# Patient Record
Sex: FEMALE | URBAN - METROPOLITAN AREA
[De-identification: names, ages, dates, MRNs, and addresses within clinical notes are randomized per-mention and may not be internally consistent; named-entity substitution may affect disease eponyms.]

---

## 2023-05-15 ENCOUNTER — NURSE ONLY (OUTPATIENT)
Dept: INTERNAL MEDICINE CLINIC | Facility: HOSPITAL | Age: 33
End: 2023-05-15
Attending: EMERGENCY MEDICINE

## 2023-05-15 DIAGNOSIS — Z00.00 WELLNESS EXAMINATION: Primary | ICD-10-CM

## 2023-05-15 PROCEDURE — 86480 TB TEST CELL IMMUN MEASURE: CPT

## 2023-05-16 LAB
M TB IFN-G CD4+ T-CELLS BLD-ACNC: 0.91 IU/ML
M TB TUBERC IFN-G BLD QL: NEGATIVE
M TB TUBERC IGNF/MITOGEN IGNF CONTROL: >10 IU/ML
QFT TB1 AG MINUS NIL: 0.17 IU/ML
QFT TB2 AG MINUS NIL: 0.08 IU/ML

## 2023-10-19 ENCOUNTER — TELEPHONE (OUTPATIENT)
Dept: FAMILY MEDICINE CLINIC | Facility: CLINIC | Age: 33
End: 2023-10-19

## 2023-10-19 NOTE — TELEPHONE ENCOUNTER
Pt returned called, states she is not currently having an allergic reaction. States she had a reaction last year to the flu vaccine and wanted to talk to provider about a potential waiver. Pt reports that last year after she got the flu vaccine, she started to have numbness and tingling in hands and feet for about a week. Two year ago, she felt numbness and tingling on the hand she got the vaccine on.  Pt is worried about guillain barre

## 2023-10-19 NOTE — TELEPHONE ENCOUNTER
Can someone call pt and find out what her symptoms are? She scheduled influenza vaccine reaction appt in several weeks and this maybe cannot wait to be seen.

## 2023-10-20 NOTE — TELEPHONE ENCOUNTER
G.B.  is not something that I diagnose or manage but I certainly can talk to her about it and refer her to neurology if she'd like

## 2023-10-20 NOTE — TELEPHONE ENCOUNTER
USHA to return call to the office. Provided pt office phone [phone] along with office hours. Per Dr. Amina Dong, pt may come in to establish care and she can take a look at the waiver that is required for pt's work. If it does not ask for specific diagnosis of G.B., she may write a note for work explaining that pt does not want to received the flu vaccine based on her previous symptoms. If pt needs to to have a diagnosis for the waiver, then she will need to be evaluated and diagnosed by neurology who would provider the waiver for flu vaccines.

## 2023-10-31 ENCOUNTER — OFFICE VISIT (OUTPATIENT)
Dept: FAMILY MEDICINE CLINIC | Facility: CLINIC | Age: 33
End: 2023-10-31

## 2023-10-31 VITALS
BODY MASS INDEX: 19.39 KG/M2 | SYSTOLIC BLOOD PRESSURE: 116 MMHG | TEMPERATURE: 99 F | HEART RATE: 72 BPM | OXYGEN SATURATION: 100 % | RESPIRATION RATE: 16 BRPM | WEIGHT: 96.19 LBS | HEIGHT: 59 IN | DIASTOLIC BLOOD PRESSURE: 78 MMHG

## 2023-10-31 DIAGNOSIS — H01.001 BLEPHARITIS OF RIGHT UPPER EYELID, UNSPECIFIED TYPE: Primary | ICD-10-CM

## 2023-10-31 PROCEDURE — 3008F BODY MASS INDEX DOCD: CPT | Performed by: NURSE PRACTITIONER

## 2023-10-31 PROCEDURE — 3078F DIAST BP <80 MM HG: CPT | Performed by: NURSE PRACTITIONER

## 2023-10-31 PROCEDURE — 99213 OFFICE O/P EST LOW 20 MIN: CPT | Performed by: NURSE PRACTITIONER

## 2023-10-31 PROCEDURE — 3074F SYST BP LT 130 MM HG: CPT | Performed by: NURSE PRACTITIONER

## 2023-10-31 NOTE — PATIENT INSTRUCTIONS
Continue warm compresses and eyelid scrubs  Follow up with ophthalmology if not improving over the next 3-4 days  Follow up sooner for new or worsening symptoms    [facility] eye [place] [id number] [address] Ophthalmology  Teresa Aguilar [id number] [facility] [place]  [phone]

## 2023-11-10 ENCOUNTER — OFFICE VISIT (OUTPATIENT)
Dept: FAMILY MEDICINE CLINIC | Facility: CLINIC | Age: 33
End: 2023-11-10
Payer: COMMERCIAL

## 2023-11-10 VITALS
WEIGHT: 96.38 LBS | OXYGEN SATURATION: 97 % | HEART RATE: 86 BPM | RESPIRATION RATE: 21 BRPM | BODY MASS INDEX: 19.17 KG/M2 | HEIGHT: 59.4 IN | SYSTOLIC BLOOD PRESSURE: 98 MMHG | TEMPERATURE: 99 F | DIASTOLIC BLOOD PRESSURE: 76 MMHG

## 2023-11-10 DIAGNOSIS — G62.9 NEUROPATHY: Primary | ICD-10-CM

## 2023-11-10 PROCEDURE — 3078F DIAST BP <80 MM HG: CPT | Performed by: FAMILY MEDICINE

## 2023-11-10 PROCEDURE — 99213 OFFICE O/P EST LOW 20 MIN: CPT | Performed by: FAMILY MEDICINE

## 2023-11-10 PROCEDURE — 3008F BODY MASS INDEX DOCD: CPT | Performed by: FAMILY MEDICINE

## 2023-11-10 PROCEDURE — 3074F SYST BP LT 130 MM HG: CPT | Performed by: FAMILY MEDICINE

## 2023-11-17 ENCOUNTER — TELEPHONE (OUTPATIENT)
Dept: INTERNAL MEDICINE CLINIC | Facility: HOSPITAL | Age: 33
End: 2023-11-17

## 2023-11-17 NOTE — TELEPHONE ENCOUNTER
Outside Covid Testing done    Results and RTW guidelines:    COVID RESULT reported:      Test type:    [] Rapid outside         [] PCR outside       [x] Home Test    Date of test: 11/17/2023     Test location: Home         [] Result viewed in Epic with verbal consent received from employee     [x] Results per Employee Covid Dashboard    [] Employee instructed to email copy of result to [email or web address]       [x] Discussed with employee     [] Unable to reach by phone. Sent via [employer]          [x] Positive    - Employee should quarantine at home for at least 5 days (day 1 is day after sx onset) , follow the CDC guidelines for cleaning and                              quarantining; see CDC.gov   -This employee may RTW on day 6 if asymptomatic or mildly symptomatic (with improving symptoms). Call Employee Health on day 5 if unable to return on                      day 6 after symptom onset.    -This employee needs to call Employee Health on day 5 after symptom onset. The employee needs to be cleared by Employee Health. - If Employee is still experiencing severe symptoms must make a RTW appt with Employee Health, Employee will not be cleared if:    1. Has consistent cough, shortness of breath or fatigue that restricts your physical activities    2. Is still feeling \"unwell\"    3. Within 15 days of hospitalization for COVID    4. Within 20 days of intubation for COVID    5. Still has a fever, vomiting or diarrhea   - Keep communication open with management about RTW and if symptoms worsen    - Monitor sx and temperature    - Employee should quarantine at home for at least 5 days from sx onset, follow the CDC guidelines for cleaning and quarantining; see CDC. gov                  - Notify PCP of result                 - Seek emergent care with worsening symptoms        Notes:     RTW PLAN:    [x]  If COVID positive results, off work minimum of 5 days from positive test or onset of symptoms (day 0) On day 5, if asymptomatic or mildly symptomatic (with improving symptoms) may return to work day 6          On day 5, if symptomatic, call Employee Health for RTW screening        []  COVID positive result - call Employee Health on day 5 after symptom onset. The employee needs to be cleared by Employee Health to RTW. [] RTW immediately, continue to monitor for sx  [] RTW when sx improve; must be fever free for 24 hours w/o medications, Diarrhea/Vomiting free for 24 hours w/o medications  [] Alinity ordered; continue to monitor sx and call for new/worsening sx. Discuss RTW guidelines with manager                                                                                                                                                                        [] Rapid ordered to confirm home negative. [] May continue to work  [x] Follow up with PCP  [] Home until further instruction from hotline with Alinity results  INSTRUCTIONS PROVIDED:  [x]  Plan as noted above  []  Length of time to obtain results  []  May return to work if views negative in My Chart and  remains fever, vomiting, and diarrhea free  []  May continue to work if remains asymptomatic   [x]  Quarantine instructions  [x]  Masking protocol  []  S/S of worsening infection/condition and importance of prompt medical re-evaluation including when to seek emergency care  [] If symptoms develop, stay home and call hotline for rapid test order    Estimated RTW date:  11/20/2023  [x] Manager notified        [x] [facility]  []AVIVA   [] [address] [place] [address]  Manager : Danyell Galeas    HAVE YOU RECEIVED THE COVID-19 Vaccine?  Yes [x]    No []          If yes, date(s) received:   12/21/2020; 01/07/2021; 12/24/2021         Which vaccine:  Aidan Yeung []     Dominick Fagan [x]    J&J []      SYMPTOMS (reported via dashboard):  [] asymptomatic  [x] symptomatic  [] GI symptoms only    Symptom onset date: 11/14/2023    Fever   > 100F             Yes []      Cough                          Yes [x] Shortness of breath  Yes []      Congestion                 Yes []      Runny nose                Yes [x]        Loss of Smell              Yes []        Loss of Taste             Yes []       Sore throat                 Yes [x]       Fatigue                        Yes []       Body Aches                Yes [x]        Chills                           Yes [x]        Headache                   Yes [x]             GI symptoms             Yes []     No [x]                     Nausea   []          Vomiting            []                                    Diarrhea  []          Upset stomach []      Employee has positive COVID Exposure? Yes [x]     No []    Date of exposure: 11/13/2023    [x]  Coworker                       [] patient                        [] Family/friend    Employee has a history of Covid?   Yes []     No [x]   If Yes, when:    When was the last shift you worked?: 11/15/2023    Notes:

## 2024-02-08 ENCOUNTER — OFFICE VISIT (OUTPATIENT)
Dept: NEUROLOGY | Facility: CLINIC | Age: 34
End: 2024-02-08
Payer: COMMERCIAL

## 2024-02-08 VITALS
HEART RATE: 74 BPM | BODY MASS INDEX: 19 KG/M2 | RESPIRATION RATE: 16 BRPM | DIASTOLIC BLOOD PRESSURE: 66 MMHG | SYSTOLIC BLOOD PRESSURE: 106 MMHG | WEIGHT: 97 LBS

## 2024-02-08 DIAGNOSIS — R20.2 TINGLING IN EXTREMITIES: ICD-10-CM

## 2024-02-08 DIAGNOSIS — R20.2 PARESTHESIA: Primary | ICD-10-CM

## 2024-02-08 PROCEDURE — 99204 OFFICE O/P NEW MOD 45 MIN: CPT | Performed by: OTHER

## 2024-02-08 NOTE — PROGRESS NOTES
Patient reports she had flu shot Fall 2022 and since then has had ? Liriano Manzanita symptoms- A few days after shot had numbness tingling of finger tips and distal portions of both feet resoved after 1 week. Would ralph Dr Cox's opinion if she should get Flu shot in future.

## 2024-02-08 NOTE — PATIENT INSTRUCTIONS
After your visit at the [place] office  today, please direct any follow up questions or medication needs to the staff in our [place] office so that your concerns may be promptly addressed.  We are available through [employer] or at the numbers below:    The phone number is:   [phone], option 1    The fax number is:  [phone]    Your pharmacy should also send any requests electronically to the [place] office.       Refill policies:    Allow 2-3 business days for refills; controlled substances may take longer.  Contact your pharmacy at least 5 days prior to running out of medication and have them send an electronic request or submit request through the “request refill” option in your [employer] account.  Refills are not addressed on weekends; covering physicians do not authorize routine medications on weekends.  No narcotics or controlled substances are refilled after noon on Fridays or by on call physicians.  By law, narcotics must be electronically prescribed.  A 30 day supply with no refills is the maximum allowed.  If your prescription is due for a refill, you may be due for a follow up appointment.  To best provide you care, patients receiving routine medications need to be seen at least once a year.  Patients receiving narcotic/controlled substance medications need to be seen at least once every 3 months.  In the event that your preferred pharmacy does not have the requested medication in stock (e.g. Backordered), it is your responsibility to find another pharmacy that has the requested medication available.  We will gladly send a new prescription to that pharmacy at your request.    Scheduling Tests:    If your physician has ordered radiology tests such as MRI or CT scans, please contact Central Scheduling at [phone] right away to schedule the test.  Once scheduled, the [facility] Centralized Referral Team will work with your insurance carrier to obtain pre-certification or prior authorization.   Depending on your insurance carrier, approval may take 3-10 days.  It is highly recommended patients assure they have received an authorization before having a test performed.  If test is done without insurance authorization, patient may be responsible for the entire amount billed.      Precertification and Prior Authorizations:  If your physician has recommended that you have a procedure or additional testing performed the [facility] Centralized Referral Team will contact your insurance carrier to obtain pre-certification or prior authorization.    You are strongly encouraged to contact your insurance carrier to verify that your procedure/test has been approved and is a COVERED benefit.  Although the [facility] Centralized Referral Team does its due diligence, the insurance carrier gives the disclaimer that \"Although the procedure is authorized, this does not guarantee payment.\"    Ultimately the patient is responsible for payment.   Thank you for your understanding in this matter.  Paperwork Completion:  If you require FMLA or disability paperwork for your recovery, please make sure to either drop it off or have it faxed to our office at [phone]. Be sure the form has your name and date of birth on it.  The form will be faxed to our Forms Department and they will complete it for you.  There is a 25$ fee for all forms that need to be filled out.  Please be aware there is a 10-14 day turnaround time.  You will need to sign a release of information (MOIRA) form if your paperwork does not come with one.  You may call the Forms Department with any questions at [phone].  Their fax number is [phone].

## 2024-02-08 NOTE — PROGRESS NOTES
HPI:    Patient ID: Deborah Merino is a 33 year old female.  PCP: Dr Maldonado     HPI  Patient is a 33-year-old female who presented for evaluation of episode of paresthesias.  She reports following a flu vaccine in 2022 she had an episode of tingling and numbness involving the fingers and toes that lasted for about a week and then resolved on its own.  She states the tingling was limited to toes and finger and did not progressed or had any associated weakness.  States she had taken Flu vaccine before and then had any side effects. At baseline she has no neuropathic symptoms. She want to know if she is ok for flu vaccine.         HISTORY:  Past Medical History:   Diagnosis Date    Anxiety 02/08/2023    Lumbar radiculopathy 2017    Migraine with aura, with intractable migraine, so stated, with status migrainosus 02/08/2021      History reviewed. No pertinent surgical history.   Family History   Problem Relation Age of Onset    Hypertension Mother     High Cholesterol Mother       Social History     Socioeconomic History    Marital status: Unknown   Tobacco Use    Smoking status: Never    Smokeless tobacco: Never   Vaping Use    Vaping Use: Never used   Substance and Sexual Activity    Alcohol use: Yes     Alcohol/week: 1.0 standard drink of alcohol     Types: 1 Glasses of wine per week     Comment: socially    Drug use: Never   Other Topics Concern    Caffeine Concern Yes     Comment: 1 cup coffee/day    Exercise Yes     Comment: 6 days/week        Review of Systems   Constitutional: Negative.    HENT: Negative.     Eyes: Negative.    Respiratory: Negative.     Cardiovascular: Negative.    Gastrointestinal: Negative.    Endocrine: Negative.    Genitourinary: Negative.    Musculoskeletal: Negative.    Skin: Negative.    Allergic/Immunologic: Negative.    Neurological: Negative.  Negative for weakness and numbness.   Hematological: Negative.    Psychiatric/Behavioral: Negative.     All other systems reviewed and are  negative.           Current Outpatient Medications   Medication Sig Dispense Refill    FLUoxetine 10 MG Oral Cap Take 1 capsule (10 mg total) by mouth daily. 90 capsule 3     Allergies:No Known Allergies  PHYSICAL EXAM:   Physical Exam    Blood pressure 106/66, pulse 74, resp. rate 16, weight 97 lb (44 kg), last menstrual period 11/09/2023.  General Appearance: Well nourished, well developed, no apparent distress.   HEENT: Normocephalic and atraumatic. Normal sclera.   Cardiovascular: Normal rate, regular rhythm and normal heart sounds.    Pulmonary/Chest: Effort normal and breath sounds normal.   Abdominal: Soft. Bowel sounds are normal.   Skin: dry, clean and intact  Ext: peripheral pulses present  Psych: normal mood and affect    Neurological:  Patient is awake, alert and oriented to person, place and time   Normal memory, attention/concentration, speech and language.    Cranial Nerves:   II: Visual fields: normal  III: Pupils: equal, round, reactive to light  III,IV,VI: Extra Ocular Movements: intact  V: Facial sensation: intact  VII: Facial strength: intact  VIII: Hearing: intact  IX: Palate: intact  XI: Shoulder shrug: intact  XII: Tongue movement: normal    Motor: Normal tone. Strength is  5 out of 5 in all extremities bilaterally.  DTR: present 2+ throughout    Sensory: Sensory examination is normal to light touch and pinprick     Coordination: Finger-to-nose test normal bilaterally without evidence of dysmetria.    Gait: Casual, toe, heel, and tandem gait are normal.         ASSESSMENT/PLAN:       ICD-10-CM    1. Paresthesia  R20.2       2. Tingling in extremities  R20.2         Patient is a 33-year-old female who presented for evaluation of an episode of paresthesia in fingers and toes following flu vaccine in 2022 which resolved after a week  There is no residual tingling numbness or any signs of neuropathy    Discussed with the patient that the symptoms of tingling was nonspecific and there is no  evidence of GBS   GBS is rare side effect of Flu vaccine but prevalence is more after a  Flu or viral infection than vaccination    Per CDC - The data on the association between GBS and seasonal flu vaccination are variable and inconsistent across flu seasons. If there is an increased risk of GBS following flu vaccination it is small, on the order of one to two additional GBS cases per million doses of flu vaccine administered.      There is no contraindication to take Flu vaccine at this point. She indicates understanding    Follow up as needed      Shady Cox MD  [facility] This Visit:  Requested Prescriptions      No prescriptions requested or ordered in this encounter       Imaging & Referrals:  None     ID#[id number]

## 2024-02-14 PROBLEM — K29.70 GASTRITIS: Status: ACTIVE | Noted: 2018-08-29

## 2024-02-19 ENCOUNTER — OFFICE VISIT (OUTPATIENT)
Dept: FAMILY MEDICINE CLINIC | Facility: CLINIC | Age: 34
End: 2024-02-19
Payer: COMMERCIAL

## 2024-02-19 VITALS
OXYGEN SATURATION: 99 % | SYSTOLIC BLOOD PRESSURE: 108 MMHG | RESPIRATION RATE: 21 BRPM | DIASTOLIC BLOOD PRESSURE: 78 MMHG | TEMPERATURE: 97 F | BODY MASS INDEX: 20 KG/M2 | WEIGHT: 100.63 LBS | HEART RATE: 94 BPM

## 2024-02-19 DIAGNOSIS — F32.A DEPRESSION, UNSPECIFIED DEPRESSION TYPE: Primary | ICD-10-CM

## 2024-02-19 DIAGNOSIS — F41.9 ANXIETY: ICD-10-CM

## 2024-02-19 PROCEDURE — 99214 OFFICE O/P EST MOD 30 MIN: CPT | Performed by: FAMILY MEDICINE

## 2024-02-19 RX ORDER — BUPROPION HYDROCHLORIDE 150 MG/1
150 TABLET ORAL DAILY
Qty: 30 TABLET | Refills: 0 | Status: SHIPPED | OUTPATIENT
Start: 2024-02-19

## 2024-02-19 NOTE — PROGRESS NOTES
HPI:     Deborah Merino is a 33 year old female presents for    Worsening mood.  Feels like mood goes down this time of year.  Her prior PCP put her on fluoxetine 10 mg daily about a year ago.  Had been doing well with it.  A couple weeks ago tried increasing fluoxetine to 20 mg on her own and felt too nauseated on it so had to go back to 10 mg daily.  Has never been on other medication for mood.  In the past tried counseling, but didn't have good connection with therapists so didn't find it helpful.  Sleep is ok.  Appetite is slightly affected.  Has passive SI, but no active plan.  No HI.      PHQ 9:  1. Little interest or pleasure in doing things: Nearly every day  2. Feeling down, depressed, or hopeless: Nearly every day  3. Trouble falling or staying asleep, or sleeping too much: Nearly every day  4. Feeling tired or having little energy: Nearly every day  5. Poor appetite or overeating: Nearly every day  6. Feeling bad about yourself - or that you are a failure or have let yourself or your family down: Nearly every day  7. Trouble concentrating on things, such as reading the newspaper or watching television: Nearly every day  8. Moving or speaking so slowly that other people could have noticed. Or the opposite - being so fidgety or restless that you have been moving around a lot more than usual: Nearly every day  9. Thoughts that you would be better off dead, or of hurting yourself in some way: Nearly every day  PHQ-9 TOTAL SCORE: 27  If you checked off any problems, how difficult have these problems made it for you to do your work, take care of things at home, or get along with other people?: Very difficult     SANA-7 Scale    Feeling nervous, anxious, or on edge: Several days  Not being able to stop or control worrying: Several days  Worrying too much about different things   : Not at all  Trouble relaxing: Several days  Being so restless that it's hard to sit still: Not at all  Becoming easily annoyed or  irritable: Over half days  Feeling afraid as if something awful might happen: Several days  SANA 7 Total Score: 6  If you checked off any problems, how difficult have these made it for you to do your work, take care of things at home, or get along with other people?: Somewhat difficult                Medications (Active prior to today's visit):  Current Outpatient Medications   Medication Sig Dispense Refill    FLUoxetine 10 MG Oral Cap Take 1 capsule (10 mg total) by mouth daily. 90 capsule 3       Allergies:  No Known Allergies    PSFH elements reviewed from today and agreed except as otherwise stated in HPI.  ROS:      Pertinent positives and negatives noted in the the HPI.    PHYSICAL EXAM:     Vitals:    02/19/24 0849   BP: 108/78   BP Location: Right arm   Patient Position: Sitting   Cuff Size: adult   Pulse: 94   Resp: 21   Temp: 97.2 °F (36.2 °C)   TempSrc: Temporal   SpO2: 99%   Weight: 100 lb 9.6 oz (45.6 kg)     Vital signs reviewed.Appears stated age, well groomed.  Physical Exam  Constitutional:       Appearance: Normal appearance.   Cardiovascular:      Rate and Rhythm: Normal rate and regular rhythm.      Pulses: Normal pulses.      Heart sounds: No murmur heard.     No friction rub. No gallop.   Pulmonary:      Effort: Pulmonary effort is normal. No respiratory distress.      Breath sounds: No wheezing, rhonchi or rales.   Abdominal:      General: Bowel sounds are normal. There is no distension.      Palpations: Abdomen is soft.      Tenderness: There is no abdominal tenderness.   Musculoskeletal:         General: No tenderness.      Cervical back: Neck supple.      Right lower leg: No edema.      Left lower leg: No edema.   Skin:     General: Skin is warm.   Neurological:      General: No focal deficit present.      Mental Status: She is alert.   Psychiatric:         Mood and Affect: Mood is depressed.         Speech: Speech normal.         Behavior: Behavior is withdrawn.          ASSESSMENT/PLAN:    33 year old female with    1. Depression, unspecified depression type    2. Anxiety       Depression and anxiety w/ exacerbation.  I would recommend changing pt's SSRI to either lexapro or citalopram.  Pt prefers to add in agent since she had been doing well on fluxoetine.  Will start wellbutrin 150 mg XL daily.  Cont fluoxetine 10 mg daily, could not tolerate 20 mg daily.    Do think she needs counseling, pt considering.  If desires, will put in  Navigator referral  F/u 1 mo for med check or sooner if needed.  If mood worsens, develops any suicidal plan, recommend eval in ER.  If does not do well with adding in wellbutrin will either recommend switching to lexapro or citalopram or f/u with psychiatry       Patient/Caregiver Education: There are no barriers to learning. Medical education done.   Outcome: Patient verbalizes understanding and agrees with plan. Advised to call or RTC if symptoms persist or worsen.    2/19/2024  Jessie Handy, DO    Patient understands plan and follow-up.

## 2024-03-19 RX ORDER — BUPROPION HYDROCHLORIDE 150 MG/1
150 TABLET ORAL DAILY
Qty: 90 TABLET | Refills: 0 | Status: SHIPPED | OUTPATIENT
Start: 2024-03-19

## 2024-03-19 NOTE — TELEPHONE ENCOUNTER
Refill Passed Protocol:     Pt requesting refill of   Requested Prescriptions     Pending Prescriptions Disp Refills    buPROPion  MG Oral Tablet 24 Hr 90 tablet 0     Sig: Take 1 tablet (150 mg total) by mouth daily.     Psychiatric Non-Scheduled (Anti-Anxiety) [id number]/2024 02:17 PM   Protocol Details In person appointment or virtual visit in the past 6 mos or appointment in next 3 mos    Depression Screening completed within the past 12 months       Refill was approved and sent to pharmacy:     Last Time Medication was Filled:  02/19/2024    Last Office Visit with Provider: 02/19/2024    No future appointments.

## 2024-04-29 PROBLEM — F32.A DEPRESSION: Status: ACTIVE | Noted: 2024-04-29

## 2024-04-29 PROBLEM — F41.9 ANXIETY: Status: ACTIVE | Noted: 2023-02-08

## 2024-05-03 ENCOUNTER — TELEPHONE (OUTPATIENT)
Dept: FAMILY MEDICINE CLINIC | Facility: CLINIC | Age: 34
End: 2024-05-03

## 2024-05-03 NOTE — TELEPHONE ENCOUNTER
Misread DR name for Patient name to complete form on vaccine. Once I realized I was in wrong chart I exited chart immediately

## 2024-09-02 RX ORDER — FLUOXETINE 10 MG/1
10 CAPSULE ORAL DAILY
Qty: 90 CAPSULE | Refills: 3 | OUTPATIENT
Start: 2024-09-02

## 2024-09-06 NOTE — TELEPHONE ENCOUNTER
1st attempt   Left voicemail to return call to the office. Provided pt office phone [phone] along with office hours.

## 2024-09-12 NOTE — TELEPHONE ENCOUNTER
Left voicemail to return call to the office as this is my 2nd attempt to try to reach you. Provided pt office phone [phone] along with office hours given.     Patient has upcoming appointment 9/25/2024

## 2024-09-23 RX ORDER — FLUOXETINE 10 MG/1
10 CAPSULE ORAL DAILY
Qty: 30 CAPSULE | Refills: 0 | Status: SHIPPED | OUTPATIENT
Start: 2024-09-23

## 2024-09-23 NOTE — TELEPHONE ENCOUNTER
Pt requesting refill of   Requested Prescriptions     Pending Prescriptions Disp Refills    FLUOXETINE 10 MG Oral Cap [Pharmacy Med Name: FLUOXETINE HCL 10 MG CAPSULE] 30 capsule 0     Sig: TAKE 1 CAPSULE BY MOUTH EVERY DAY     Last Time Medication was Filled:  6/05/2024    Last Office Visit with Provider: 6/05/2024  Anxiety and depression relatively stable. Pt wishes to stay on same dose of prozac for now-10 mg daily. Didn't like how she felt on 20 mg dose. She also wishes to start on wellbutrin immediate release low dose-75 mg daily.     Appt. scheduled on 9/25/2024       Spoke with patient and she states medication working well for her.

## 2024-09-25 ENCOUNTER — OFFICE VISIT (OUTPATIENT)
Dept: FAMILY MEDICINE CLINIC | Facility: CLINIC | Age: 34
End: 2024-09-25
Payer: COMMERCIAL

## 2024-09-25 VITALS
HEART RATE: 115 BPM | OXYGEN SATURATION: 99 % | WEIGHT: 95.81 LBS | SYSTOLIC BLOOD PRESSURE: 106 MMHG | DIASTOLIC BLOOD PRESSURE: 70 MMHG | TEMPERATURE: 98 F | RESPIRATION RATE: 18 BRPM | BODY MASS INDEX: 19 KG/M2

## 2024-09-25 DIAGNOSIS — Z13.228 SCREENING FOR ENDOCRINE, NUTRITIONAL, METABOLIC AND IMMUNITY DISORDER: ICD-10-CM

## 2024-09-25 DIAGNOSIS — Z13.29 SCREENING FOR ENDOCRINE, NUTRITIONAL, METABOLIC AND IMMUNITY DISORDER: ICD-10-CM

## 2024-09-25 DIAGNOSIS — Z00.00 ANNUAL PHYSICAL EXAM: Primary | ICD-10-CM

## 2024-09-25 DIAGNOSIS — F41.9 ANXIETY: ICD-10-CM

## 2024-09-25 DIAGNOSIS — Z13.0 SCREENING FOR ENDOCRINE, NUTRITIONAL, METABOLIC AND IMMUNITY DISORDER: ICD-10-CM

## 2024-09-25 DIAGNOSIS — F32.A DEPRESSION, UNSPECIFIED DEPRESSION TYPE: ICD-10-CM

## 2024-09-25 DIAGNOSIS — Z12.4 SCREENING FOR CERVICAL CANCER: ICD-10-CM

## 2024-09-25 DIAGNOSIS — Z13.6 SCREENING FOR ISCHEMIC HEART DISEASE: ICD-10-CM

## 2024-09-25 DIAGNOSIS — Z13.21 SCREENING FOR ENDOCRINE, NUTRITIONAL, METABOLIC AND IMMUNITY DISORDER: ICD-10-CM

## 2024-09-25 DIAGNOSIS — E55.9 VITAMIN D INSUFFICIENCY: ICD-10-CM

## 2024-09-25 PROCEDURE — 99395 PREV VISIT EST AGE 18-39: CPT | Performed by: FAMILY MEDICINE

## 2024-09-25 RX ORDER — FLUOXETINE 10 MG/1
10 CAPSULE ORAL DAILY
Qty: 90 CAPSULE | Refills: 1 | Status: SHIPPED | OUTPATIENT
Start: 2024-09-25

## 2024-09-25 NOTE — PROGRESS NOTES
Subjective:   Deborah Merino is a 34 year old female who presents for complete px.      The patient reports:  No changes in nevi  No changes in vision   Also needs refill of prozac.  Feels like mood is stable on it.  Started menses today, declines pap smear today.    Went off of wellbutrin.  Was having abd cramping.  Wants to stay on prozac 10 mg daily for now.      Low vit D in past, wants it checked    PHQ 9:  1. Little interest or pleasure in doing things: Not at all  2. Feeling down, depressed, or hopeless: Not at all  3. Trouble falling or staying asleep, or sleeping too much: Not at all  4. Feeling tired or having little energy: Several days  5. Poor appetite or overeating: Not at all  6. Feeling bad about yourself - or that you are a failure or have let yourself or your family down: Not at all  7. Trouble concentrating on things, such as reading the newspaper or watching television: Not at all  8. Moving or speaking so slowly that other people could have noticed. Or the opposite - being so fidgety or restless that you have been moving around a lot more than usual: Not at all  9. Thoughts that you would be better off dead, or of hurting yourself in some way: Not at all  PHQ-9 TOTAL SCORE: 1  If you checked off any problems, how difficult have these problems made it for you to do your work, take care of things at home, or get along with other people?: Not difficult at all       SANA-7 Scale       Feeling nervous, anxious, or on edge: Not at all  Not being able to stop or control worrying: Not at all  Worrying too much about different things   : Not at all  Trouble relaxing: Not at all  Being so restless that it's hard to sit still: Not at all  Becoming easily annoyed or irritable: Not at all  Feeling afraid as if something awful might happen: Not at all    SANA 7 Total Score: 0    If you checked off any problems, how difficult have these made it for you to do your work, take care of things at home, or get along with  other people?: Not difficult at all        History:   LMP: Patient's last menstrual period was 2024 (exact date).  Last pap date: 5+ yrs ago   Abnormal pap? no  : 0  Para: 0    History/Other:    Chief Complaint Reviewed and Verified  Nursing Notes Reviewed and   Verified  Tobacco Reviewed  Allergies Reviewed  Medications Reviewed    Medical History Reviewed  Surgical History Reviewed  OB Status Reviewed    Family History Reviewed  Social History Reviewed         Tobacco:  She has never smoked tobacco.    Current Outpatient Medications   Medication Sig Dispense Refill    FLUOXETINE 10 MG Oral Cap TAKE 1 CAPSULE BY MOUTH EVERY DAY 30 capsule 0         Review of Systems:  See HPI     Objective:   /70 (BP Location: Left arm, Patient Position: Sitting, Cuff Size: adult)   Pulse 115   Temp 98.3 °F (36.8 °C) (Temporal)   Resp 18   Wt 95 lb 12.8 oz (43.5 kg)   LMP 2024 (Exact Date)   SpO2 99%   BMI 19.09 kg/m²  Estimated body mass index is 19.09 kg/m² as calculated from the following:    Height as of 11/10/23: 4' 11.4\" (1.509 m).    Weight as of this encounter: 95 lb 12.8 oz (43.5 kg).  Physical Exam  Constitutional:       Appearance: Normal appearance. She is well-developed and well-groomed.   HENT:      Right Ear: Tympanic membrane and ear canal normal.      Left Ear: Tympanic membrane and ear canal normal.      Mouth/Throat:      Mouth: Mucous membranes are moist.      Pharynx: Oropharynx is clear.   Eyes:      Pupils: Pupils are equal, round, and reactive to light.   Cardiovascular:      Rate and Rhythm: Normal rate and regular rhythm.      Pulses: Normal pulses.      Heart sounds: No murmur heard.     No friction rub. No gallop.   Pulmonary:      Effort: Pulmonary effort is normal. No respiratory distress.      Breath sounds: No wheezing, rhonchi or rales.   Abdominal:      General: Bowel sounds are normal. There is no distension.      Palpations: Abdomen is soft.       Tenderness: There is no abdominal tenderness.   Musculoskeletal:         General: No tenderness.      Cervical back: Neck supple.      Right lower leg: No edema.      Left lower leg: No edema.   Skin:     General: Skin is warm.   Neurological:      General: No focal deficit present.      Mental Status: She is alert.   Psychiatric:         Mood and Affect: Mood normal.         Speech: Speech normal.         Behavior: Behavior normal. Behavior is cooperative.       Assessment & Plan:   1. Annual physical exam    2. Vitamin D insufficiency    3. Screening for ischemic heart disease    4. Screening for endocrine, nutritional, metabolic and immunity disorder    5. Screening for cervical cancer      Screening labs-cmp, lipids, cbc, TSH.  Also vit D as has been low before.   Pap very overdue.  Plans on seeing gyne for this.  Thinking about seeing Dr. Nam Kendrick [facility]   Anxiety/depression stable.  Cont on same dose prozac 10 mg daily  Patient counseling: nutrition: stressed importance of moderation in sodium/caffeine intake, saturated fat and cholesterol, caloric balance, sufficient intake of fresh fruits, vegetables, fiber, calcium, iron.  Exercise: stressed the importance of regular exercise  F/u 6 mo for med check or sooner if needed     Jessie Handy DO, 09/25/24, 2:50 PM

## 2025-04-09 ENCOUNTER — LAB ENCOUNTER (OUTPATIENT)
Dept: LAB | Age: 35
End: 2025-04-09
Attending: FAMILY MEDICINE
Payer: COMMERCIAL

## 2025-04-09 DIAGNOSIS — Z00.00 ANNUAL PHYSICAL EXAM: ICD-10-CM

## 2025-04-09 DIAGNOSIS — Z13.6 SCREENING FOR ISCHEMIC HEART DISEASE: ICD-10-CM

## 2025-04-09 DIAGNOSIS — Z13.21 SCREENING FOR ENDOCRINE, NUTRITIONAL, METABOLIC AND IMMUNITY DISORDER: ICD-10-CM

## 2025-04-09 DIAGNOSIS — E55.9 VITAMIN D INSUFFICIENCY: ICD-10-CM

## 2025-04-09 DIAGNOSIS — Z13.228 SCREENING FOR ENDOCRINE, NUTRITIONAL, METABOLIC AND IMMUNITY DISORDER: ICD-10-CM

## 2025-04-09 DIAGNOSIS — Z13.29 SCREENING FOR ENDOCRINE, NUTRITIONAL, METABOLIC AND IMMUNITY DISORDER: ICD-10-CM

## 2025-04-09 DIAGNOSIS — Z13.0 SCREENING FOR ENDOCRINE, NUTRITIONAL, METABOLIC AND IMMUNITY DISORDER: ICD-10-CM

## 2025-04-09 LAB
ALBUMIN SERPL-MCNC: 4.7 G/DL (ref 3.2–4.8)
ALBUMIN/GLOB SERPL: 1.9 {RATIO} (ref 1–2)
ALP LIVER SERPL-CCNC: 52 U/L (ref 37–98)
ALT SERPL-CCNC: 15 U/L (ref 10–49)
ANION GAP SERPL CALC-SCNC: 11 MMOL/L (ref 0–18)
AST SERPL-CCNC: 20 U/L (ref ?–34)
BASOPHILS # BLD AUTO: 0.03 X10(3) UL (ref 0–0.2)
BASOPHILS NFR BLD AUTO: 0.6 %
BILIRUB SERPL-MCNC: 0.9 MG/DL (ref 0.3–1.2)
BUN BLD-MCNC: 11 MG/DL (ref 9–23)
CALCIUM BLD-MCNC: 9.5 MG/DL (ref 8.7–10.6)
CHLORIDE SERPL-SCNC: 105 MMOL/L (ref 98–112)
CHOLEST SERPL-MCNC: 155 MG/DL (ref ?–200)
CO2 SERPL-SCNC: 24 MMOL/L (ref 21–32)
CREAT BLD-MCNC: 0.94 MG/DL (ref 0.55–1.02)
EGFRCR SERPLBLD CKD-EPI 2021: 82 ML/MIN/1.73M2 (ref 60–?)
EOSINOPHIL # BLD AUTO: 0.04 X10(3) UL (ref 0–0.7)
EOSINOPHIL NFR BLD AUTO: 0.9 %
ERYTHROCYTE [DISTWIDTH] IN BLOOD BY AUTOMATED COUNT: 12.3 %
FASTING PATIENT LIPID ANSWER: YES
FASTING STATUS PATIENT QL REPORTED: YES
GLOBULIN PLAS-MCNC: 2.5 G/DL (ref 2–3.5)
GLUCOSE BLD-MCNC: 89 MG/DL (ref 70–99)
HCT VFR BLD AUTO: 44.2 % (ref 35–48)
HDLC SERPL-MCNC: 67 MG/DL (ref 40–59)
HGB BLD-MCNC: 15.1 G/DL (ref 12–16)
IMM GRANULOCYTES # BLD AUTO: 0.01 X10(3) UL (ref 0–1)
IMM GRANULOCYTES NFR BLD: 0.2 %
LDLC SERPL CALC-MCNC: 77 MG/DL (ref ?–100)
LYMPHOCYTES # BLD AUTO: 2.21 X10(3) UL (ref 1–4)
LYMPHOCYTES NFR BLD AUTO: 47.4 %
MCH RBC QN AUTO: 30.2 PG (ref 26–34)
MCHC RBC AUTO-ENTMCNC: 34.2 G/DL (ref 31–37)
MCV RBC AUTO: 88.4 FL (ref 80–100)
MONOCYTES # BLD AUTO: 0.39 X10(3) UL (ref 0.1–1)
MONOCYTES NFR BLD AUTO: 8.4 %
NEUTROPHILS # BLD AUTO: 1.98 X10 (3) UL (ref 1.5–7.7)
NEUTROPHILS # BLD AUTO: 1.98 X10(3) UL (ref 1.5–7.7)
NEUTROPHILS NFR BLD AUTO: 42.5 %
NONHDLC SERPL-MCNC: 88 MG/DL (ref ?–130)
OSMOLALITY SERPL CALC.SUM OF ELEC: 289 MOSM/KG (ref 275–295)
PLATELET # BLD AUTO: 271 10(3)UL (ref 150–450)
POTASSIUM SERPL-SCNC: 4.1 MMOL/L (ref 3.5–5.1)
PROT SERPL-MCNC: 7.2 G/DL (ref 5.7–8.2)
RBC # BLD AUTO: 5 X10(6)UL (ref 3.8–5.3)
SODIUM SERPL-SCNC: 140 MMOL/L (ref 136–145)
TRIGL SERPL-MCNC: 52 MG/DL (ref 30–149)
TSI SER-ACNC: 0.83 UIU/ML (ref 0.55–4.78)
VIT D+METAB SERPL-MCNC: 9 NG/ML (ref 30–100)
VLDLC SERPL CALC-MCNC: 8 MG/DL (ref 0–30)
WBC # BLD AUTO: 4.7 X10(3) UL (ref 4–11)

## 2025-04-09 PROCEDURE — 80053 COMPREHEN METABOLIC PANEL: CPT

## 2025-04-09 PROCEDURE — 84443 ASSAY THYROID STIM HORMONE: CPT

## 2025-04-09 PROCEDURE — 80061 LIPID PANEL: CPT

## 2025-04-09 PROCEDURE — 36415 COLL VENOUS BLD VENIPUNCTURE: CPT

## 2025-04-09 PROCEDURE — 85025 COMPLETE CBC W/AUTO DIFF WBC: CPT

## 2025-04-09 PROCEDURE — 82306 VITAMIN D 25 HYDROXY: CPT

## 2025-04-10 ENCOUNTER — TELEPHONE (OUTPATIENT)
Dept: FAMILY MEDICINE CLINIC | Facility: CLINIC | Age: 35
End: 2025-04-10

## 2025-04-10 DIAGNOSIS — E55.9 VITAMIN D DEFICIENCY: Primary | ICD-10-CM

## 2025-04-10 RX ORDER — ERGOCALCIFEROL 1.25 MG/1
50000 CAPSULE, LIQUID FILLED ORAL WEEKLY
Qty: 8 CAPSULE | Refills: 0 | Status: SHIPPED | OUTPATIENT
Start: 2025-04-10 | End: 2025-05-30

## 2025-04-11 NOTE — TELEPHONE ENCOUNTER
Refill(s) Requested:   Requested Prescriptions     Pending Prescriptions Disp Refills    FLUoxetine 10 MG Oral Cap [Pharmacy Med Name: FLUOXETINE HCL 10 MG CAPSULE] 90 capsule 0     Sig: TAKE 1 CAPSULE BY MOUTH EVERY DAY     Medication Last Prescribed:  9/25/2024 90 days 1 refills     Has dose or medication been changed    since last prescription? *Review notes*    []  Yes       [x]  No     Last office visit: 9/25/2024 (in-office)  Visit date not found (virtual visit)     Relevant details from LOV note: Anxiety/depression stable. Cont on same dose prozac 10 mg daily      Relevant lab results: N/A    Patient was asked to follow-up in: Return in about 6 months (around 3/25/2025) for medication check fluoxetine     Appointment due: March 2025    Future Appointments: 4/16/2025 Jessie Handy DO     Action taken:  [x] Request routed to provider for review - [ ] Medication not on protocol

## 2025-04-11 NOTE — TELEPHONE ENCOUNTER
LMTCB to discuss lab results.    Vit D follow up would be around June 6th, pt. would need to follow up to have Vit D. levels re-checked.

## 2025-04-11 NOTE — TELEPHONE ENCOUNTER
Labs show vit D level is very low.  Needs rx vit D 50,000 international units  weekly for 8 weeks total.  At end of 8 weeks needs to get vit D level rechecked.

## 2025-04-12 NOTE — TELEPHONE ENCOUNTER
Pt informed of and reviewed test results and indications per  regarding test results. Patient verbalized understanding.   Closing encounter

## 2025-04-14 RX ORDER — FLUOXETINE 10 MG/1
10 CAPSULE ORAL DAILY
Qty: 30 CAPSULE | Refills: 0 | Status: SHIPPED | OUTPATIENT
Start: 2025-04-14

## 2025-04-14 NOTE — TELEPHONE ENCOUNTER
I can only refill for 30 days since she is overdue for appt.  Needs to keep appt in 2 days for continued refills.

## (undated) NOTE — LETTER
?  PREPARTICIPATION PHYSICAL EVALUATION  MEDICAL ELIGIBILITY FORM  [] Medically eligible for all sports without restrictions   [] Medically eligible for all sports without restriction with recommendations for further evaluation or treatment     []Medically eligible for certain sports     [] Not medically eligible pending further evaluation   [] Not medically eligible for any sports    Recommendations:        I have examined the student named on this form and completed the preparticipation physical evaluation. The athlete does not have apparent clinical contraindications to practice and can participate in the sport(s) as outlined on this form. A copy of the physical examination findings are on record in my office and can be made available to the school at the request of the parents. If conditions  arise after the athlete has been cleared for participation, the physician may rescind the medical eligibility until the problem is resolved and the potential consequences are completely explained to the athlete (and parents or guardians).    Name of healthcare professional (print or type: Jessie Handy DO Date: 9/25/2024     Address: [address] [place] [address], [id number] Phone: Dept: [phone]      Signature of health care professional:  ***    SHARED EMERGENCY INFORMATION  Allergies: has No Known Allergies.    Medications: Deborah has a current medication list which includes the following prescription(s): fluoxetine.     Other Information:      Emergency contacts:   Name Relationship Lgl Grd Work Phone Home Phone Mobile Phone   1. ALEENA ESQUIVEL Mother No   [phone]         Supplemental COVID?19 questions  1. Have you had any of the following symptoms in the past 14 days?  (Place Check [place])                a)      Fever or chills Yes  No    b)      Cough Yes  No    c)       Shortness of breath or difficulty breathing Yes  No    d)      Fatigue Yes  No    e)      Muscle or body aches Yes  No    f)       Headache Yes  No    g)       New loss of taste or smell Yes  No    h)      Sore throat Yes  No    i)       Congestion or runny nose Yes  No    j)       Nausea or vomiting Yes  No    k)      Diarrhea Yes  No    l)       Date symptoms started Yes  No    m)    Date symptoms resolved Yes  No   2. Have you ever had a positive text for COVID-19?   Yes                            No              If yes:        Date of Test ____________      Were you tested because you had symptoms? Yes  No              If yes:        a)       Date symptoms started ____________     b)      Date symptoms resolved  ____________     c)      Were you hospitalized? Yes No    d)      Did you have fever > 100.4 F Yes No                 If yes, how many days did your fever last? ____________     e)      Did you have muscle aches, chills, or lethargy? Yes No    f)       Have you had the vaccine? Yes No        Were you tested because you were exposed to someone with COVID-19, but you did not have any symptoms?  Yes No   3. Has anyone living in your household had any of the following symptoms or tested positive for COVID-19 in the past 14 days? Yes   No                                       If yes, which symptoms [] Fever or chills    []Muscle or body aches   []Nausea or vomiting        [] Sore throat     [] Headache  [] Shortness of breath or difficulty breathing   [] New loss of taste or smell   [] Congestion or runny nose   [] Cough     [] Fatigue     [] Diarrhea   4. Have you been within 6 feet for more than 15 minutes of someone with COVID-19   In the past 14 days? Yes      No                   If yes: date(s) of exposure                  5. Are you currently waiting on results from a recent COVID test?     Yes    No         Sources:  Interim Guidance on the Preparticipation Physical Examinatio... : Clinical Journal of Sport Medicine (lww.com)  Supplemental COVID?19 Questions (lww.com)  COVID?19 Interim Guidance: Return to Sports and Physical Activity (aap.org)      ?   PREPARTICIPATION PHYSICAL EVALUATION   HISTORY FORM  Note: Complete and sign this form (with your parents if younger than 18) before your appointment.  Name: Deborah Merino YOB: 1990   Date of Examination: 9/25/2024 Sport(s):    Sex assigned at birth: female How do you identify your gender? female     List past and current medical conditions:  has a past medical history of Anxiety (02/08/2023), Depression (04/29/2024), Lumbar radiculopathy (2017), and Migraine with aura, with intractable migraine, so stated, with status migrainosus (02/08/2021).   Have you ever had surgery? If yes, list all past surgical procedures.  has no past surgical history on file.   Medicines and supplements: List all current prescriptions, over-the-counter medicines, and supplements (herbal and nutritional). I have changed Deborah Merino's FLUoxetine.   Do you have any allergies? If yes, please list all your allergies (ie, medicines, pollens, food, stinging insects). has No Known Allergies.       Patient Health Questionnaire Version 4 (PHQ-4)  Over the last 2 weeks, how often have you been bothered by any of the following problems? ([place] response.)      Not at all Several days Over half the days Nearly  every day   Feeling nervous, anxious, or on edge 0 1 2 3   Not being able to stop or control worrying 0 1 2 3   Little interest or pleasure in doing things 0 1 2 3   Feeling down, depressed, or hopeless 0 1 2 3     (A sum of >=3 is considered positive on either subscale [questions 1 and 2, or questions 3 and 4] for screening purposes.)       GENERAL QUESTIONS  (Explain “Yes” answers at the end of this form.  [place] questions if you don’t know the answer.) Yes No   Do you have any concerns that you would like to discuss with your provider? [] []   Has a provider ever denied or restricted your participation in sports for any reason? [] []   Do you have any ongoing medical issues or recent illnesses?  [] []   HEART HEALTH QUESTIONS  ABOUT YOU Yes No   Have you ever passed out or nearly passed out during or after exercise? [] []   Have you ever had discomfort, pain, tightness, or pressure in your chest during exercise? [] []   Does your heart ever race, flutter in your chest, or skip beats (irregular beats) during exercise? [] []   Has a doctor ever told you that you have any heart problems? [] []   8.     Has a doctor ever requested a test for your heart? For         example, electrocardiography (ECG) or         echocardiography. [] []    HEART HEALTH QUESTIONS ABOUT YOU        (CONTINUED) Yes No   9.  Do you get light -headed or feel shorter of breath      than your friends during exercise? [] []   10.  Have you ever had a seizure? [] []   HEART HEALTH QUESTIONS ABOUT YOUR FAMILY     Yes No   11. Has any family member or relative  of heart           problems or had an unexpected or unexplained        sudden death before age 35 years (including             drowning or unexplained car crash)? [] []   12. Does anyone in your family have a genetic heart           problem  like hypertrophic cardiomyopathy                   (HCM), Marfan syndrome, arrhythmogenic right           ventricular cardiomyopathy (ARVC), long QT               Brugada syndrome, or a catecholaminergic              polymorphic ventricular tachycardia (CPVT)? [] []   13. Has anyone in your family had a pacemaker or      an implanted defibrillation before age 35? [] []                BONE AND JOINT QUESTIONS Yes No   14.   Have you ever had a stress fracture or an injury to a bone, muscle, ligament, joint, or tendon that caused you to miss a practice or game? [] []   15.   Do you have a bone, muscle, ligament, or joint injury that bothers you? [] []   MEDICAL QUESTIONS Yes No   16.   Do you cough, wheeze, or have difficulty breathing during or after exercise? [] []   17.   Are you missing a kidney, an eye, a testicle (males), your spleen, or any other organ? [] []   18.   Do  you have groin or testicle pain or a painful bulge or hernia in the groin area? [] []   19.   Do you have any recurring skin rashes or rashes that come and go, including herpes or methicillin-resistant Staphylococcus aureus (MRSA)? [] []   20.   Have you had a concussion or head injury that caused confusion, a prolonged headache, or memory problems?  []     []       21.   Have you ever had numbness, had tingling, had weakness in your arms or legs, or been unable to move your arms or legs after being hit or falling? [] []   22.   Have you ever become ill while exercising in the heat? [] []   23.   Do you or does someone in your family have sickle cell trait or disease? [] []   24.   Have you ever had or do you have any prob- lems with your eyes or vision? [] []    MEDICAL  QUESTIONS  (CONTINUED  ) Yes No   25.    Do you worry about  your weight? [] []   26. Are you trying to or has anyone recommended that you gain or lose  Weight? [] []   27. Are you on a special diet or do you avoid certain types of foods or food groups? [] []   28.  Have you ever had an eating disorder?                 NO CLEARA [] []   FEMALES ONLY Yes No   29.  Have you ever had a menstrual period? [] []   30. How old were you when you had your first menstrual period?      Explain \"Yes\" answers here.    ______________________________________________________________________________________________________________________________________________________________________________________________________________________________________________________________________________________________________________________________________________________________________________________________________________________________________________________________________________________________________________________________________     I hereby state that, to the best of my knowledge, my answers to the questions on this form are complete and correct.    Signature of  athlete:____________________________________________________________________________________________  Signature of parent or gaurdian:__________________________________________________________________________________     Date: 9/25/2024      ?  PREPARTICIPATION PHYSICAL EVALUATION   PHYSICAL EXAMINATION FORM  Name: Deborah Merino          YOB: 1990  PHYSICIAN REMINDERS  Consider additional questions on more-sensitive issues.  Do you feel stressed out or under a lot of pressure?  Do you ever feel sad, hopeless, depressed, or anxious?  Do you feel safe at your home or residence?  During the past 30 days, did you use chewing tobacco, snuff, or dip?  Do you drink alcohol or use any other drugs?  Have you ever taken anabolic steroids or used any other performance-enhancing supplement?  Have you ever taken any supplements to help you gain or lose weight or improve your performance?  Do you wear a seat belt, use a helmet, and use condoms?  Consider reviewing questions on cardiovascular symptoms (Q4-Q13 of History Form).    EXAMINATION   Height: 4' 11.4\" (1.509 m) (11/10/2023  1:56 PM)     Weight: 95 lb 12.8 oz (43.5 kg) (9/25/2024  2:15 PM)     BP: 106/70 (9/25/2024  2:15 PM)     Pulse: 115 (9/25/2024  2:15 PM)   Vision: R 20/      L 20/  Corrected: [] Y []  N   MEDICAL NORMAL ABNORMAL FINDINGS   Appearance  Marfan stigmata (kyphoscoliosis, high-arched palate, pectus excavatum, arachnodactyly, hyperlaxity, myopia, mitral valve prolapse [MVP], and aortic insufficiency)   [x]    []       Eyes, ears, nose, and throat  Pupils equal  Hearing   [x]  []     Lymph nodes   [x]  []   [place]arta  Murmurs (auscultation standing, auscultation supine, and ± Valsalva maneuver)   [x]  []   Lungs   [x]  []   Abdomen   [x]  []   Skin  Herpes simplex virus (HSV), lesions suggestive of methicillin-resistant Staphylococcus aureus (MRSA), or tinea corporis   [x]  []   Neurological   [x]  []   MUSCULOSKELETAL NORMAL ABNORMAL FINDINGS    Neck   [x]  []    Back   [x]  []   Shoulder and arm   [x]  []     Elbow and forearm   [x]  []     Wrist, hand, and fingers   [x]  []     Hip and thigh   [x]  []   Knee   [x]  []     Leg and ankle   [x]  []   Foot and toes   [x]  []   Functional  Double-leg squat test, single-leg squat test, and box drop or step drop test   [x]  []   Consider electrocardiography (ECG), echocardiography, referral to a cardiologist for abnormal cardiac history or examination findings, or a combination of those.  Name of healthcare professional (print or type: Jessie Handy DO Date: 9/25/2024     Address: [address] [place] [address], [id number] Phone: Dept: [phone]     Signature:***